# Patient Record
Sex: FEMALE | Race: BLACK OR AFRICAN AMERICAN | NOT HISPANIC OR LATINO | URBAN - METROPOLITAN AREA
[De-identification: names, ages, dates, MRNs, and addresses within clinical notes are randomized per-mention and may not be internally consistent; named-entity substitution may affect disease eponyms.]

---

## 2020-10-31 ENCOUNTER — EMERGENCY (EMERGENCY)
Facility: HOSPITAL | Age: 30
LOS: 1 days | Discharge: HOME | End: 2020-10-31
Attending: EMERGENCY MEDICINE | Admitting: EMERGENCY MEDICINE
Payer: COMMERCIAL

## 2020-10-31 VITALS
DIASTOLIC BLOOD PRESSURE: 101 MMHG | HEART RATE: 101 BPM | SYSTOLIC BLOOD PRESSURE: 123 MMHG | OXYGEN SATURATION: 100 % | RESPIRATION RATE: 16 BRPM | TEMPERATURE: 98 F

## 2020-10-31 VITALS
OXYGEN SATURATION: 100 % | SYSTOLIC BLOOD PRESSURE: 100 MMHG | DIASTOLIC BLOOD PRESSURE: 55 MMHG | HEART RATE: 84 BPM | RESPIRATION RATE: 16 BRPM

## 2020-10-31 DIAGNOSIS — Z34.90 ENCOUNTER FOR SUPERVISION OF NORMAL PREGNANCY, UNSPECIFIED, UNSPECIFIED TRIMESTER: ICD-10-CM

## 2020-10-31 LAB
ALBUMIN SERPL ELPH-MCNC: 4 G/DL — SIGNIFICANT CHANGE UP (ref 3.3–5)
ALP SERPL-CCNC: 44 U/L — SIGNIFICANT CHANGE UP (ref 40–120)
ALT FLD-CCNC: 14 U/L — SIGNIFICANT CHANGE UP (ref 4–33)
ANION GAP SERPL CALC-SCNC: 10 MMO/L — SIGNIFICANT CHANGE UP (ref 7–14)
APPEARANCE UR: CLEAR — SIGNIFICANT CHANGE UP
AST SERPL-CCNC: 13 U/L — SIGNIFICANT CHANGE UP (ref 4–32)
BACTERIA # UR AUTO: SIGNIFICANT CHANGE UP
BASOPHILS # BLD AUTO: 0.02 K/UL — SIGNIFICANT CHANGE UP (ref 0–0.2)
BASOPHILS NFR BLD AUTO: 0.2 % — SIGNIFICANT CHANGE UP (ref 0–2)
BILIRUB SERPL-MCNC: < 0.2 MG/DL — LOW (ref 0.2–1.2)
BILIRUB UR-MCNC: NEGATIVE — SIGNIFICANT CHANGE UP
BLD GP AB SCN SERPL QL: NEGATIVE — SIGNIFICANT CHANGE UP
BLOOD UR QL VISUAL: NEGATIVE — SIGNIFICANT CHANGE UP
BUN SERPL-MCNC: 12 MG/DL — SIGNIFICANT CHANGE UP (ref 7–23)
CALCIUM SERPL-MCNC: 9 MG/DL — SIGNIFICANT CHANGE UP (ref 8.4–10.5)
CHLORIDE SERPL-SCNC: 105 MMOL/L — SIGNIFICANT CHANGE UP (ref 98–107)
CO2 SERPL-SCNC: 23 MMOL/L — SIGNIFICANT CHANGE UP (ref 22–31)
COLOR SPEC: YELLOW — SIGNIFICANT CHANGE UP
CREAT SERPL-MCNC: 0.78 MG/DL — SIGNIFICANT CHANGE UP (ref 0.5–1.3)
EOSINOPHIL # BLD AUTO: 0.12 K/UL — SIGNIFICANT CHANGE UP (ref 0–0.5)
EOSINOPHIL NFR BLD AUTO: 1.4 % — SIGNIFICANT CHANGE UP (ref 0–6)
GLUCOSE SERPL-MCNC: 98 MG/DL — SIGNIFICANT CHANGE UP (ref 70–99)
GLUCOSE UR-MCNC: NEGATIVE — SIGNIFICANT CHANGE UP
HCG SERPL-ACNC: SIGNIFICANT CHANGE UP MIU/ML
HCT VFR BLD CALC: 35 % — SIGNIFICANT CHANGE UP (ref 34.5–45)
HGB BLD-MCNC: 11.6 G/DL — SIGNIFICANT CHANGE UP (ref 11.5–15.5)
HYALINE CASTS # UR AUTO: NEGATIVE — SIGNIFICANT CHANGE UP
IMM GRANULOCYTES NFR BLD AUTO: 0.5 % — SIGNIFICANT CHANGE UP (ref 0–1.5)
KETONES UR-MCNC: NEGATIVE — SIGNIFICANT CHANGE UP
LEUKOCYTE ESTERASE UR-ACNC: NEGATIVE — SIGNIFICANT CHANGE UP
LYMPHOCYTES # BLD AUTO: 3.27 K/UL — SIGNIFICANT CHANGE UP (ref 1–3.3)
LYMPHOCYTES # BLD AUTO: 38.2 % — SIGNIFICANT CHANGE UP (ref 13–44)
MCHC RBC-ENTMCNC: 28.1 PG — SIGNIFICANT CHANGE UP (ref 27–34)
MCHC RBC-ENTMCNC: 33.1 % — SIGNIFICANT CHANGE UP (ref 32–36)
MCV RBC AUTO: 84.7 FL — SIGNIFICANT CHANGE UP (ref 80–100)
MONOCYTES # BLD AUTO: 0.7 K/UL — SIGNIFICANT CHANGE UP (ref 0–0.9)
MONOCYTES NFR BLD AUTO: 8.2 % — SIGNIFICANT CHANGE UP (ref 2–14)
NEUTROPHILS # BLD AUTO: 4.41 K/UL — SIGNIFICANT CHANGE UP (ref 1.8–7.4)
NEUTROPHILS NFR BLD AUTO: 51.5 % — SIGNIFICANT CHANGE UP (ref 43–77)
NITRITE UR-MCNC: NEGATIVE — SIGNIFICANT CHANGE UP
NRBC # FLD: 0 K/UL — SIGNIFICANT CHANGE UP (ref 0–0)
PH UR: 6.5 — SIGNIFICANT CHANGE UP (ref 5–8)
PLATELET # BLD AUTO: 358 K/UL — SIGNIFICANT CHANGE UP (ref 150–400)
PMV BLD: 9.1 FL — SIGNIFICANT CHANGE UP (ref 7–13)
POTASSIUM SERPL-MCNC: 4.3 MMOL/L — SIGNIFICANT CHANGE UP (ref 3.5–5.3)
POTASSIUM SERPL-SCNC: 4.3 MMOL/L — SIGNIFICANT CHANGE UP (ref 3.5–5.3)
PROT SERPL-MCNC: 6.6 G/DL — SIGNIFICANT CHANGE UP (ref 6–8.3)
PROT UR-MCNC: 20 — SIGNIFICANT CHANGE UP
RBC # BLD: 4.13 M/UL — SIGNIFICANT CHANGE UP (ref 3.8–5.2)
RBC # FLD: 13.5 % — SIGNIFICANT CHANGE UP (ref 10.3–14.5)
RBC CASTS # UR COMP ASSIST: SIGNIFICANT CHANGE UP (ref 0–?)
RH IG SCN BLD-IMP: POSITIVE — SIGNIFICANT CHANGE UP
SODIUM SERPL-SCNC: 138 MMOL/L — SIGNIFICANT CHANGE UP (ref 135–145)
SP GR SPEC: 1.03 — SIGNIFICANT CHANGE UP (ref 1–1.04)
SQUAMOUS # UR AUTO: SIGNIFICANT CHANGE UP
UROBILINOGEN FLD QL: NORMAL — SIGNIFICANT CHANGE UP
WBC # BLD: 8.56 K/UL — SIGNIFICANT CHANGE UP (ref 3.8–10.5)
WBC # FLD AUTO: 8.56 K/UL — SIGNIFICANT CHANGE UP (ref 3.8–10.5)
WBC UR QL: SIGNIFICANT CHANGE UP (ref 0–?)

## 2020-10-31 PROCEDURE — 99283 EMERGENCY DEPT VISIT LOW MDM: CPT

## 2020-10-31 PROCEDURE — 99284 EMERGENCY DEPT VISIT MOD MDM: CPT

## 2020-10-31 PROCEDURE — 76817 TRANSVAGINAL US OBSTETRIC: CPT | Mod: 26

## 2020-10-31 NOTE — ED PROVIDER NOTE - PATIENT PORTAL LINK FT
You can access the FollowMyHealth Patient Portal offered by [facility] by registering at the following website: [email or web address]. By joining [facility]’s FollowMyHealth portal, you will also be able to view your health information using other applications (apps) compatible with our system.

## 2020-10-31 NOTE — CONSULT NOTE ADULT - SUBJECTIVE AND OBJECTIVE BOX
GYN Consult Note    30y  LMP  IUP confirmed by US at private OB, Dr. Mattson. Pt states she had another ultrasound yesterday and "wasn't told any information except to follow up a pregnancy hormone". Does like OB, so come today to find new OB and follow up HCG. Denies vb, cramping. No complaints at all.         OB/GYN HISTORY:   G1: present  Denies GYN history    Last Menstrual Period     Name of GYN Physician: Dr. Mattson    PAST MEDICAL & SURGICAL HISTORY:  No pertinent past medical history    No significant past surgical history        REVIEW OF SYSTEMS  General: denies fevers, chills, tiredness  Skin/Breast: denies breast pain  Respiratory and Thorax: denies shortness of breath, denies cough  Cardiovascular: denies chest pain and denies palpitations  Gastrointestinal: denies abdominal pain, nausea/ vomiting	  Genitourinary: denies dysuria, increased urinary frequency, urgency	  Constitutional, Cardiovascular, Respiratory, Gastrointestinal, Genitourinary, Musculoskeletal and Integumentary review of systems are normal except as noted. 	          Allergies    No Known Allergies    Intolerances        SOCIAL HISTORY: denies t/e/d    FAMILY HISTORY:  No pertinent family history in first degree relatives        Vital Signs Last 24 Hrs  T(C): 36.8 (31 Oct 2020 10:33), Max: 36.8 (31 Oct 2020 10:33)  T(F): 98.2 (31 Oct 2020 10:33), Max: 98.2 (31 Oct 2020 10:33)  HR: 84 (31 Oct 2020 12:55) (84 - 101)  BP: 100/55 (31 Oct 2020 12:55) (100/55 - 123/101)  BP(mean): --  RR: 16 (31 Oct 2020 12:55) (16 - 16)  SpO2: 100% (31 Oct 2020 12:55) (100% - 100%)    PHYSICAL EXAM:   Gen: NAD, alert and oriented x 3  Cardiovascular: regular   Respiratory: breathing comfortably on RA  Abd: soft, non tender, non-distended  Extremities: NTBL  Skin: warm and well perfused      LABS:                        11.6   8.56  )-----------( 358      ( 31 Oct 2020 11:47 )             35.0     10-    138  |  105  |  12  ----------------------------<  98  4.3   |  23  |  0.78    Ca    9.0      31 Oct 2020 11:47    TPro  6.6  /  Alb  4.0  /  TBili  < 0.2<L>  /  DBili  x   /  AST  13  /  ALT  14  /  AlkPhos  44  10-      HCG Quantitative, Serum: 20492: For pregnancy evaluation the reference values are as  follows:    Negative: <5 mIU/mL  Indeterminate: 5-25 mIU/mL (suggest repeat testing in 72hrs)  Positive: >25 mIU/mL    Note: hCG results of false positive and false negative for  pregnancy are rare, but can occur with this, and other hCG  tests. Chitra- and post-menopausal females may have mildly  elevated hCG concentrations, usually less than 14 mIU/mL,  that are constant over time.    Weeks of pregnancy:           mIU/mL  ------------------        -------  3                     6 -      71  4                    10 -     750  5                   220 -   7,100  6                   160 -  32,000  7                 3,700 - 164,000  8                32,000 - 150,000  9                64,000- 151,000  10                47,000 - 187,000  12                28,000 - 211,000  14                14,000 -  63,000  15                12,000 -  71,000  16 - 18            8,000 -  58,000 mIU/mL (10.31.20 @ 11:47)          Urinalysis Basic - ( 31 Oct 2020 12:08 )    Color: YELLOW / Appearance: CLEAR / S.028 / pH: 6.5  Gluc: NEGATIVE / Ketone: NEGATIVE  / Bili: NEGATIVE / Urobili: NORMAL   Blood: NEGATIVE / Protein: 20 / Nitrite: NEGATIVE   Leuk Esterase: NEGATIVE / RBC: 0-2 / WBC 3-5   Sq Epi: FEW / Non Sq Epi: x / Bacteria: FEW        RADIOLOGY & ADDITIONAL STUDIES:  < from: US Transvaginal, OB (10.31.20 @ 14:01) >  FINDINGS:  Uterus: Single intrauterine gestation. 2.5 x 0.9 x 1.8 cm subchorionic hematoma.    Gestational Sac Size (mean): 2.5 cm.  Gestations Sac Shape : Normal.  Crown Rump Length: 0.6 cm.  Estimated Gestational Age based on CRL: 6 weeks and 3 days.  Yolk Sac: Normal.  Fetal Heart Rate: Not identified.    Right ovary: 3.3 x 2.9 x 1.8 cm. There is corpus luteum measuring 1.6 x 2.0 x 1.3 cm. Normal arterial and venous waveforms.  Left ovary: 3.0 x 1.8 x 1.7 cm. Within normal limits. Normal arterial and venous waveforms.    Fluid: None.    IMPRESSION:    Single intrauterine gestation with fetal heart rate not identified on this examination, which may be secondary to early gestational age. Interval follow-up with sonogram recommended.    2.5 x 0.9 x 1.8 cm subchorionic hematoma    Estimated gestational age of 6 weeks and 3 days based on CRL.    Estimated due date of  2021.            NATHALIA MCALLISTER M.D.,RADIOLOGY RESIDENT  This document has been electronically signed.  YULIA SIMEON M.D., ATTENDING RADIOLOGIST  This document has been electronically signed. Oct 31 2020  2:11PM    < end of copied text >

## 2020-10-31 NOTE — ED PROVIDER NOTE - NSFOLLOWUPINSTRUCTIONS_ED_ALL_ED_FT
1) Please follow up with OBGYN within 3-5 days for re-evaluation of pregnancy. Repeat ultrasound and beta HCG level  2) Return to the ED immediately for new or worsening symptoms including fever, chest pain, shortness of breath or nausea/vomiting  3) Please continue to take any home medications as prescribed. Take Tylenol 325 mg every 4 hours for pain relief/fever control  4) Your test results from your ED visit were discussed with you prior to discharge  5) You were provided with a copy of your test results

## 2020-10-31 NOTE — ED PROVIDER NOTE - PHYSICAL EXAMINATION
Vital signs reviewed.   CONSTITUTIONAL: Well-appearing; well-nourished; in no apparent distress. Non-toxic appearing.   HEAD: Normocephalic, atraumatic.  EYES: PERRL, EOM intact, conjunctiva and no sclera injection noted  ENT: normal nose; no rhinorrhea  CARD: Normal S1, S2  RESP: Normal chest excursion with respiration; breath sounds clear and equal bilaterally  ABD/GI: soft, non-distended; non-tender; no CVA tenderness  EXT/MS: moves all extremities; distal pulses are normal, no pedal edema.  SKIN: Normal for age and race; warm; dry; good turgor; no apparent lesions or exudate noted.  NEURO: Awake, alert, oriented x 3, no gross deficits, CN II-XII grossly intact, no motor or sensory deficit noted.  PSYCH: Normal mood; appropriate affect. Vital signs reviewed.   CONSTITUTIONAL: Well-appearing; well-nourished; in no apparent distress. Non-toxic appearing.   HEAD: Normocephalic, atraumatic.  EYES: PERRL, EOM intact, conjunctiva and no sclera injection noted  ENT: normal nose; no rhinorrhea  CARD: Normal S1, S2  RESP: Normal chest excursion with respiration; breath sounds clear and equal bilaterally  ABD/GI: soft, non-distended; non-tender; no CVA tenderness  : Deferred  EXT/MS: moves all extremities; distal pulses are normal, no pedal edema.  SKIN: Normal for age and race; warm; dry; good turgor; no apparent lesions or exudate noted.  NEURO: Awake, alert, oriented x 3, no gross deficits, CN II-XII grossly intact, no motor or sensory deficit noted.  PSYCH: Normal mood; appropriate affect.

## 2020-10-31 NOTE — ED PROVIDER NOTE - NS ED ROS FT
Constitutional: (-) fever   Head: Normal cephalic, Atraumatic  Eyes/ENT: (-) vision changes  Cardiovascular: (-) chest pain, (-) wheezing  Respiratory: (-) cough, (-) shortness of breath  Gastrointestinal: (-) vomiting, (-) diarrhea, (-) abdominal pain  : (-) dysuria   Musculoskeletal: (-) back pain  Integumentary: (-) rash, (-) edema  Neurological: (-)loc  Allergic/Immunologic: (-) pruritus

## 2020-10-31 NOTE — CONSULT NOTE ADULT - ATTENDING COMMENTS
Attending Note  agree wiht above   highly desired pregnancy   no pain or bleeding   CRL no FHTs   recommend f/u next week  precautions reviewed

## 2020-10-31 NOTE — ED PROVIDER NOTE - NS ED MD EM SELECTION
Called over to Dr Luis Angel Rodríguez office to let them know Maisie Gaucher is requesting the MRI done soon urgent [phone]

## 2020-10-31 NOTE — CONSULT NOTE ADULT - PROBLEM SELECTOR RECOMMENDATION 9
- Extended discussion with patient regarding possibility that this may be a viable vs nonviable pregnancy. According to guidelines, a CRL >/=7mm with no FH is considered diagnostic of failed pregnancy, however at this point, pt measuring 6mm. As this is a snapshot of the pregnancy, cannot be determined at this time. Patient is understanding of all this information. All questions answered to apparent satisfaction.  - List of OBGYN providers in area to be given to patient. Discussed with patient she can also call her insurance company to determine who to follow up with in area.  - Pt to follow up next week for repeat ultrasound, bhcg, and establishment of care.  - Can return to ED if unable to find provider/appointment.    d/w Dr. Nadege Russ PGY-2  [phone]

## 2020-10-31 NOTE — CONSULT NOTE ADULT - ASSESSMENT
29 yo  LMP  w/ confirmed IUP. Measurement of CRL with no FH borderline. Desired pregnancy. Difficult to ascertain at this time.

## 2020-10-31 NOTE — ED PROVIDER NOTE - CLINICAL SUMMARY MEDICAL DECISION MAKING FREE TEXT BOX
31 Y/O F  LMP  w/ no PMH presents to ER for pregnancy check. Transvaginal U/S and labs to assess pregnancy 31 Y/O F  LMP  w/ no PMH presents to ER for pregnancy check. Transvaginal U/S and labs to assess pregnancy    U/S demonstrates gestation sac @ 6 weeks but no FHR. OBGYN recommends repeat HCG/US. Will follow up with her own OBGYN Bradley att: 31 yo F  LMP  w/ no PMH presents to ER for pregnancy check. Transvaginal U/S and labs to assess pregnancy    U/S demonstrates gestation sac @ 6 weeks but no FHR. OBGYN recommends repeat HCG/US. Will follow up with her own OBGYN

## 2020-11-01 LAB
CULTURE RESULTS: SIGNIFICANT CHANGE UP
SPECIMEN SOURCE: SIGNIFICANT CHANGE UP

## 2020-11-07 ENCOUNTER — EMERGENCY (EMERGENCY)
Facility: HOSPITAL | Age: 30
LOS: 1 days | Discharge: HOME | End: 2020-11-07
Attending: EMERGENCY MEDICINE | Admitting: EMERGENCY MEDICINE
Payer: COMMERCIAL

## 2020-11-07 VITALS
OXYGEN SATURATION: 100 % | HEART RATE: 94 BPM | DIASTOLIC BLOOD PRESSURE: 68 MMHG | RESPIRATION RATE: 16 BRPM | SYSTOLIC BLOOD PRESSURE: 110 MMHG

## 2020-11-07 VITALS
SYSTOLIC BLOOD PRESSURE: 139 MMHG | DIASTOLIC BLOOD PRESSURE: 90 MMHG | TEMPERATURE: 98 F | HEART RATE: 100 BPM | RESPIRATION RATE: 18 BRPM | OXYGEN SATURATION: 100 %

## 2020-11-07 DIAGNOSIS — O02.1 MISSED ABORTION: ICD-10-CM

## 2020-11-07 PROBLEM — Z78.9 OTHER SPECIFIED HEALTH STATUS: Chronic | Status: ACTIVE | Noted: 2020-10-31

## 2020-11-07 LAB
ANION GAP SERPL CALC-SCNC: 10 MMO/L — SIGNIFICANT CHANGE UP (ref 7–14)
APPEARANCE UR: CLEAR — SIGNIFICANT CHANGE UP
BACTERIA # UR AUTO: SIGNIFICANT CHANGE UP
BASOPHILS # BLD AUTO: 0.02 K/UL — SIGNIFICANT CHANGE UP (ref 0–0.2)
BASOPHILS NFR BLD AUTO: 0.2 % — SIGNIFICANT CHANGE UP (ref 0–2)
BILIRUB UR-MCNC: NEGATIVE — SIGNIFICANT CHANGE UP
BLOOD UR QL VISUAL: NEGATIVE — SIGNIFICANT CHANGE UP
BUN SERPL-MCNC: 12 MG/DL — SIGNIFICANT CHANGE UP (ref 7–23)
CALCIUM SERPL-MCNC: 8.7 MG/DL — SIGNIFICANT CHANGE UP (ref 8.4–10.5)
CHLORIDE SERPL-SCNC: 101 MMOL/L — SIGNIFICANT CHANGE UP (ref 98–107)
CO2 SERPL-SCNC: 22 MMOL/L — SIGNIFICANT CHANGE UP (ref 22–31)
COLOR SPEC: YELLOW — SIGNIFICANT CHANGE UP
CREAT SERPL-MCNC: 0.66 MG/DL — SIGNIFICANT CHANGE UP (ref 0.5–1.3)
EOSINOPHIL # BLD AUTO: 0.16 K/UL — SIGNIFICANT CHANGE UP (ref 0–0.5)
EOSINOPHIL NFR BLD AUTO: 1.8 % — SIGNIFICANT CHANGE UP (ref 0–6)
GLUCOSE SERPL-MCNC: 102 MG/DL — HIGH (ref 70–99)
GLUCOSE UR-MCNC: NEGATIVE — SIGNIFICANT CHANGE UP
HCG SERPL-ACNC: SIGNIFICANT CHANGE UP MIU/ML
HCT VFR BLD CALC: 35.3 % — SIGNIFICANT CHANGE UP (ref 34.5–45)
HGB BLD-MCNC: 11.2 G/DL — LOW (ref 11.5–15.5)
HYALINE CASTS # UR AUTO: NEGATIVE — SIGNIFICANT CHANGE UP
IMM GRANULOCYTES NFR BLD AUTO: 0.3 % — SIGNIFICANT CHANGE UP (ref 0–1.5)
KETONES UR-MCNC: NEGATIVE — SIGNIFICANT CHANGE UP
LEUKOCYTE ESTERASE UR-ACNC: NEGATIVE — SIGNIFICANT CHANGE UP
LYMPHOCYTES # BLD AUTO: 3.39 K/UL — HIGH (ref 1–3.3)
LYMPHOCYTES # BLD AUTO: 38.4 % — SIGNIFICANT CHANGE UP (ref 13–44)
MCHC RBC-ENTMCNC: 28 PG — SIGNIFICANT CHANGE UP (ref 27–34)
MCHC RBC-ENTMCNC: 31.7 % — LOW (ref 32–36)
MCV RBC AUTO: 88.3 FL — SIGNIFICANT CHANGE UP (ref 80–100)
MONOCYTES # BLD AUTO: 0.76 K/UL — SIGNIFICANT CHANGE UP (ref 0–0.9)
MONOCYTES NFR BLD AUTO: 8.6 % — SIGNIFICANT CHANGE UP (ref 2–14)
NEUTROPHILS # BLD AUTO: 4.46 K/UL — SIGNIFICANT CHANGE UP (ref 1.8–7.4)
NEUTROPHILS NFR BLD AUTO: 50.7 % — SIGNIFICANT CHANGE UP (ref 43–77)
NITRITE UR-MCNC: NEGATIVE — SIGNIFICANT CHANGE UP
NRBC # FLD: 0 K/UL — SIGNIFICANT CHANGE UP (ref 0–0)
PH UR: 6 — SIGNIFICANT CHANGE UP (ref 5–8)
PLATELET # BLD AUTO: 328 K/UL — SIGNIFICANT CHANGE UP (ref 150–400)
PMV BLD: 9.1 FL — SIGNIFICANT CHANGE UP (ref 7–13)
POTASSIUM SERPL-MCNC: 3.6 MMOL/L — SIGNIFICANT CHANGE UP (ref 3.5–5.3)
POTASSIUM SERPL-SCNC: 3.6 MMOL/L — SIGNIFICANT CHANGE UP (ref 3.5–5.3)
PROT UR-MCNC: 20 — SIGNIFICANT CHANGE UP
RBC # BLD: 4 M/UL — SIGNIFICANT CHANGE UP (ref 3.8–5.2)
RBC # FLD: 13.5 % — SIGNIFICANT CHANGE UP (ref 10.3–14.5)
RBC CASTS # UR COMP ASSIST: SIGNIFICANT CHANGE UP (ref 0–?)
SODIUM SERPL-SCNC: 133 MMOL/L — LOW (ref 135–145)
SP GR SPEC: 1.03 — SIGNIFICANT CHANGE UP (ref 1–1.04)
SQUAMOUS # UR AUTO: SIGNIFICANT CHANGE UP
UROBILINOGEN FLD QL: NORMAL — SIGNIFICANT CHANGE UP
WBC # BLD: 8.82 K/UL — SIGNIFICANT CHANGE UP (ref 3.8–10.5)
WBC # FLD AUTO: 8.82 K/UL — SIGNIFICANT CHANGE UP (ref 3.8–10.5)
WBC UR QL: SIGNIFICANT CHANGE UP (ref 0–?)

## 2020-11-07 PROCEDURE — 99284 EMERGENCY DEPT VISIT MOD MDM: CPT

## 2020-11-07 PROCEDURE — 76830 TRANSVAGINAL US NON-OB: CPT | Mod: 26

## 2020-11-07 NOTE — CONSULT NOTE ADULT - ATTENDING COMMENTS
Pt with missed AB.  VSS, pt denies pain/ bleeding at this time.  Pt with private insurance.  To follow up with Dr. Quigley as outpt.  Vaginal bleeding and pain precautions given, pt made aware of when to return to ED.      Beryl Gongora MD

## 2020-11-07 NOTE — ED PROVIDER NOTE - PROGRESS NOTE DETAILS
MELANIE Cagle: pt seen and cleared by OB/GYN pt to follow up outpatient with Dr Quigley.  Pt advised of results.  Pt feels well ambulating without difficulty.  Results reviewed with patient.  Discharge reviewed and discussed with patient.

## 2020-11-07 NOTE — CONSULT NOTE ADULT - ASSESSMENT
31 yo  LMP  w/ confirmed IUP, now diagnosed with missed .. Pt seen last week with IUP and borderline FH. Confirmed missed . This was a planned desired pregnancy. Pt does not currently have an outside ObGyn.     Patient counseled on options of surgical management with D&C vs. medical managment with cytotec vs. expectant management.       Patient counseled on risks of D&C including infection; risk of hemorrhage, possibly requiring a blood transfusion; and risk of uterine or cervical injury, possibly requiring laparoscopy, laparotomy or hysterectomy.  These were all reviewed in detail.  The patient was counseled on the small risk of uterine perforation and risk of injury to rectum, bowel, bladder, pelvic nerves, blood vessels and ureters.  The patient was also counseled on the small risk of the need for further surgery and of the risk, as with any surgical procedure, of death.  The patient understands the risks.      Patient counseled on risks of medical managment including risk of infection- which may require further treatment with medication, surgery, antibiotics or hospital admission; risk of heavy bleeding and possible need for subsequent surgical procedure and risk of failure to pass retained products.  Also explained risks of cramping, fever, diarrhea, nausea, vomiting, headache, dizziness, back pain and feeling tired associated with cytotec use.  It was explained in detail that she will experience heavy bleeding a few hours after cytotec administration.  Explained that need to have another individual with her when taking the cytotec and the need to be close to an ER.  Explained need to go to ER if any heavy bleeding >2pads per hour for over 2 hours or feels lightheaded or dizzy.  Explained need to call if no passage of tissue or clot within 24-48 hours.  Explained that can take ibuprofen 600 mg q6 hours for abdominal cramping after taking cytotec.      Patient also counseled on expectant management. Pt clinically well appearing, with stable VS. Stable H/H. No current VB. No current signs or symptoms of infection. Explained to pt that she may elect for expectant managment in this circumstance, to allow her body to pass pregnancy tissue on its own. Given that FH was borderline last week, likely missed  for several days. Explained to patient would permit no more than 1 week for passage of pregnancy at this time. Explained expectations for bleeding/cramping if she does pass tissue at home. Explained she can take ibuprofen 600 mg q6h for pain and cramping. Explained if patient does NOT pass tissue, if pt develops FEVERS/CHILLS, significant nausea/vomiting, severe abdominal pain - pt must return to ED for evaluation. Explained importance of careful follow up with GYN following this pregnancy and within ONE WEEK if she does not pass pregnancy tissue.     Patient opts for expectant management. Pt counseled on expectations and importance of follow up.

## 2020-11-07 NOTE — ED PROVIDER NOTE - CARE PROVIDER_API CALL
Tre Quigley  OBSTETRICS AND GYNECOLOGY  [address], [address] [place] [address]  Phone: [phone]  Fax: [phone]  Follow Up Time:

## 2020-11-07 NOTE — ED PROVIDER NOTE - NSFOLLOWUPINSTRUCTIONS_ED_ALL_ED_FT
Follow up with your Primary Medical Doctor in 1-2 days.  Follow up with OB/GYN Dr Beckham in 1-2 days.  Rest.  Drink plenty of fluids.  Return to the ER for any persistent/worsening or new symptoms weakness, dizziness, pain, discharge, bleeding, fevers, chills or any concerning symptoms.

## 2020-11-07 NOTE — CONSULT NOTE ADULT - PROBLEM SELECTOR RECOMMENDATION 9
-patient instructed to return to ED if any heavy vaginal bleeding >2 pads/hr for over 2 hours   -patient instructed to follow up her OBGYN within one week.   -please provide patient with Dr. Tre Quigley information for follow up  -patient instructed to use ibuprofen 600 mg q6 hrs for pain   -all questions/concerns of patient addressed    dw Dr. Rolan Fang PGY2

## 2020-11-07 NOTE — ED PROVIDER NOTE - ATTENDING CONTRIBUTION TO CARE
Attending Statement: I have reviewed and agree with all pertinent clinical information, including history and physical exam and agree with treatment plan of the PA, except as noted.  29yo F no sig pmhx pw "I need a repeat us and blood test" LMP 9-1-20 told to be approx 6 weeks preg a week ago, comes back for repeat US and HCG. Denies any abdominal pain, n/v, vaginal bleeding or discharge. no chest pain no sob no prior preg no dysuria no hematuria no frequency  Vital signs noted. nontoxic female soft nt abdomen. deferred pelvic for now  plan labs, urine, tvus, re assess

## 2020-11-07 NOTE — ED ADULT NURSE NOTE - NSIMPLEMENTINTERV_GEN_ALL_ED
Implemented All Universal Safety Interventions:  [place] to call system. Call bell, personal items and telephone within reach. Instruct patient to call for assistance. Room bathroom lighting operational. Non-slip footwear when patient is off stretcher. Physically safe environment: no spills, clutter or unnecessary equipment. Stretcher in lowest position, wheels locked, appropriate side rails in place.

## 2020-11-07 NOTE — ED PROVIDER NOTE - OBJECTIVE STATEMENT
29 y/o female with no significant PMHx currently 6 weeks pregnant presents to the ER for repeat BHCG and US.  Pt was seen in the ER 1 week ago for evaluation of pregnancy and was advised to come back to the ER for repeat testing.  Pt denies pain, bleeding, discharge, fevers, chills, weakness, dizziness, dysuria, frequency.  .  LMP .

## 2020-11-07 NOTE — CONSULT NOTE ADULT - SUBJECTIVE AND OBJECTIVE BOX
ANSELMO TORO  30y  Female [id number]    HPI:        Name of GYN Physician:     POB:      Pgyn: Denies fibroids, cysts, endometriosis, STI's, Abnormal pap smears     Home meds:     Hospital Meds:   MEDICATIONS  (STANDING):    MEDICATIONS  (PRN):      Allergies    No Known Allergies    Intolerances        PAST MEDICAL & SURGICAL HISTORY:  No pertinent past medical history    No significant past surgical history        FAMILY HISTORY:  No pertinent family history in first degree relatives        Social History:  Denies smoking use, drug use, alcohol use.   +occasional social alcohol use    Vital Signs Last 24 Hrs  T(C): 36.8 (2020 08:47), Max: 36.8 (2020 08:47)  T(F): 98.3 (2020 08:47), Max: 98.3 (2020 08:47)  HR: 100 (2020 08:47) (100 - 100)  BP: 139/90 (2020 08:47) (139/90 - 139/90)  BP(mean): --  RR: 18 (2020 08:47) (18 - 18)  SpO2: 100% (2020 08:47) (100% - 100%)    Physical Exam:   General: sitting comftorably in bed, NAD   HEENT: neck supple, full ROM  CV: RR S1S2 no m/r/g  Lungs: CTA b/l, good air flow b/l   Back: No CVA tenderness  Abd: Soft, non-tender, non-distended.  Bowel sounds present.    :  No bleeding on pad.    External labia wnl.  Bimanual exam with no cervical motion tenderness, uterus wnl, adnexa non palpable b/l.  Cervix closed vs. Cervix dilated    cm   Speculum Exam: No active bleeding from os.  Physiologic discharge.    Ext: non-tender b/l, no edema     LABS:                              11.2   8.82  )-----------( 328      ( 2020 09:46 )             35.3     11-07    133<L>  |  101  |  12  ----------------------------<  102<H>  3.6   |  22  |  0.66    Ca    8.7      2020 09:46      I&O's Detail      Urinalysis Basic - ( 2020 09:46 )    Color: YELLOW / Appearance: CLEAR / S.030 / pH: 6.0  Gluc: NEGATIVE / Ketone: NEGATIVE  / Bili: NEGATIVE / Urobili: NORMAL   Blood: NEGATIVE / Protein: 20 / Nitrite: NEGATIVE   Leuk Esterase: NEGATIVE / RBC: 0-2 / WBC 3-5   Sq Epi: FEW / Non Sq Epi: x / Bacteria: OCC        RADIOLOGY & ADDITIONAL STUDIES: ANSELMO TORO  30y  Female [id number]    30y  LMP  IUP confirmed by US at private OB, Dr. Mattson. Pt seen in ED 10/31 w/ confirmed IUP. Measurement of CRL with no FH borderline. As this was a desired pregnancy - extended discussion had with patient regarding possibility that this may be a viable vs nonviable pregnancy. Pt told to follow up with provider within 1 week for repeat beta-hcg and TVUS. Pt unable to schedule appt with outside physician and has not yet selected ObGyn. Since last visit, pt declines any abdominal cramping, VB. Denies N/V. Denies fevers/chills. No acute complaints. Pt does not wish to follow up with prior Ob.     Gyn consulted for follow up and diagnosis of missed .       OB/GYN HISTORY:   G1: present  Denies GYN history    Last Menstrual Period     Name of GYN Physician: Dr. Mattson    PAST MEDICAL & SURGICAL HISTORY:  No pertinent past medical history    No significant past surgical history    REVIEW OF SYSTEMS  General: denies fevers, chills, tiredness  Skin/Breast: denies breast pain  Respiratory and Thorax: denies shortness of breath, denies cough  Cardiovascular: denies chest pain and denies palpitations  Gastrointestinal: denies abdominal pain, nausea/ vomiting	  Genitourinary: denies dysuria, increased urinary frequency, urgency	  Constitutional, Cardiovascular, Respiratory, Gastrointestinal, Genitourinary, Musculoskeletal and Integumentary review of systems are normal except as noted. 	    Vital Signs Last 24 Hrs  T(C): 36.8 (2020 08:47), Max: 36.8 (2020 08:47)  T(F): 98.3 (2020 08:47), Max: 98.3 (2020 08:47)  HR: 100 (2020 08:47) (100 - 100)  BP: 139/90 (2020 08:47) (139/90 - 139/90)  BP(mean): --  RR: 18 (2020 08:47) (18 - 18)  SpO2: 100% (2020 08:47) (100% - 100%)    Physical Exam:   General: sitting comftorably in bed, NAD   CV: RR S1S2   Lungs: CTA b/l, good air flow b/l   Abd: Soft, non-tender, non-distended.   : Spec/bimanual deferred as not clinically indicated  Ext: non-tender b/l, no edema     LABS:                              11.2   8.82  )-----------( 328      ( 2020 09:46 )             35.3     11-    133<L>  |  101  |  12  ----------------------------<  102<H>  3.6   |  22  |  0.66    Ca    8.7      2020 09:46      I&O's Detail      Urinalysis Basic - ( 2020 09:46 )    Color: YELLOW / Appearance: CLEAR / S.030 / pH: 6.0  Gluc: NEGATIVE / Ketone: NEGATIVE  / Bili: NEGATIVE / Urobili: NORMAL   Blood: NEGATIVE / Protein: 20 / Nitrite: NEGATIVE   Leuk Esterase: NEGATIVE / RBC: 0-2 / WBC 3-5   Sq Epi: FEW / Non Sq Epi: x / Bacteria: OCC      HCG Quantitative, Serum (20 @ 09:46)   HCG Quantitative, Serum: 16081: For pregnancy evaluation the reference values are as   follows:   Negative: <5 mIU/mL   Indeterminate: 5-25 mIU/mL (suggest repeat testing in 72hrs)   Positive: >25 mIU/mL   Note: hCG results of false positive and false negative for   pregnancy are rare, but can occur with this, and other hCG   tests. Chitra- and post-menopausal females may have mildly   elevated hCG concentrations, usually less than 14 mIU/mL,   that are constant over time.   Weeks of pregnancy: mIU/mL   ------------------ -------   3 6 - 71   4 10 - 750   5 220 - 7,100   6 160 - 32,000   7 3,700 - 164,000   8 32,000 - 150,000   9 64,000- 151,000   10 47,000 - 187,000   12 28,000 - 211,000   14 14,000 - 63,000   15 12,000 - 71,000   16 - 18 8,000 - 58,000 mIU/mL         RADIOLOGY & ADDITIONAL STUDIES:  < from: US Transvaginal (20 @ 11:59) >    EXAM:  US TRANSVAGINAL        PROCEDURE DATE:  2020         INTERPRETATION:  CLINICAL INFORMATION: Follow-up pregnancy, serum beta-hCG 54074 on 2020 and 08965 on 10/31/2020    LMP: 2020    COMPARISON: Pelvic sonogram 10/31/2020    TECHNIQUE:  Endovaginal pelvic sonogram as per order. Transabdominal pelvic sonogram was also performed as part of our standard protocol. Color and Spectral Doppler was performed.    FINDINGS:    Uterus: Single, slightly irregular intrauterine gestational sac is identified. The cervix is closed.    Crown-rump length: 0.9 cm  Estimated gestational age by CRL: 6 weeks and 6 days  Yolk sac: Present.  Fetal heart rate: None detected.    Right ovary: 4.3 x 1.9 x 2.3 cm. Within normal limits. Normal blood flow on Doppler.  Left ovary: 2.2 x 1.7 x 1.9 cm. Within normal limits. Normal blood flow on Doppler.    Fluid: None.    IMPRESSION:  Single intrauterine gestational sac containing a 9 mm fetal pole without cardiac activity, diagnostic for fetal demise. Estimated gestational age is also discrepant with dates, in keeping with decreasing beta hCG.            RUY DELANEY MD; Resident Radiology  This document has been electronically signed.  KIMBERLY SOARES MD; Attending Radiologist  This document has been electronically signed. 2020 12:23PM    < end of copied text >

## 2020-11-07 NOTE — CONSULT NOTE ADULT - PROVIDER SPECIALTY LIST ADULT
Pt to [facility] for lab draw.  Awake and alert in no acute distress.  Port accessed with 22g 3/4 inch cade needle with 1 attempt and labs drawn from the port without difficulty. Pt tolerated well.   Port flushed per orders (see MAR) and port de-accessed.  Plan to follow up with Dr Santana  for results.   GYN

## 2020-11-07 NOTE — ED PROVIDER NOTE - CLINICAL SUMMARY MEDICAL DECISION MAKING FREE TEXT BOX
31 y/o female with no significant PMHx currently 6 weeks pregnant presents to the ER for repeat BHCG and US.  Pt was seen in the ER 1 week ago for evaluation of pregnancy and was advised to come back to the ER for repeat testing. Pt is well appearing, NAD, will check labs, US, follow up OB/GYN.

## 2020-11-07 NOTE — ED ADULT TRIAGE NOTE - CHIEF COMPLAINT QUOTE
Pt states that she was unable to find GYN follow up and presents for US and HCG check.  Pt offers no complaints of vaginal bleeding or abd pain.  Pt denies past medical hx, denies daily medications.  Pt states that she was told that she is approx. 6 weeks pregnant

## 2020-11-09 LAB
CULTURE RESULTS: SIGNIFICANT CHANGE UP
SPECIMEN SOURCE: SIGNIFICANT CHANGE UP

## 2023-12-12 NOTE — ED PROVIDER NOTE - PATIENT PORTAL LINK FT
You can access the FollowMyHealth Patient Portal offered by [facility] by registering at the following website: [email or web address]. By joining [employer]’s FollowMyHealth portal, you will also be able [email or web address] view your health information using other applications (apps) compatible with our system. Home